# Patient Record
Sex: MALE | Race: WHITE | Employment: FULL TIME | URBAN - METROPOLITAN AREA
[De-identification: names, ages, dates, MRNs, and addresses within clinical notes are randomized per-mention and may not be internally consistent; named-entity substitution may affect disease eponyms.]

---

## 2022-06-09 ENCOUNTER — OFFICE VISIT (OUTPATIENT)
Dept: ORTHOPEDIC SURGERY | Age: 32
End: 2022-06-09
Payer: COMMERCIAL

## 2022-06-09 VITALS — HEIGHT: 69 IN | WEIGHT: 190 LBS | BODY MASS INDEX: 28.14 KG/M2

## 2022-06-09 DIAGNOSIS — M77.8 TRICEPS TENDONITIS: ICD-10-CM

## 2022-06-09 DIAGNOSIS — M54.12 CERVICAL RADICULOPATHY: Primary | ICD-10-CM

## 2022-06-09 PROCEDURE — 99204 OFFICE O/P NEW MOD 45 MIN: CPT | Performed by: ORTHOPAEDIC SURGERY

## 2022-06-09 RX ORDER — PREDNISONE 10 MG/1
TABLET ORAL
COMMUNITY
Start: 2022-06-05

## 2022-06-09 RX ORDER — NAPROXEN 500 MG/1
TABLET ORAL
COMMUNITY
Start: 2022-06-05

## 2022-06-09 RX ORDER — MELOXICAM 7.5 MG/1
7.5 TABLET ORAL 2 TIMES DAILY
Qty: 42 TABLET | Refills: 0 | Status: SHIPPED | OUTPATIENT
Start: 2022-06-09 | End: 2022-06-30

## 2022-06-09 NOTE — PROGRESS NOTES
Orthopaedic Hand Clinic Note    Name: Malvin Eaton  YOB: 1990  Gender: male  MRN: [id number]      CC: Patient referred for evaluation of upper extremity pain    HPI: Malvin Eaton is a 29 y.o. male with a chief complaint of pain in his right elbow. He is pointing to the distal triceps tendon. He said this started as pain in his neck, and this eventually radiated down to his shoulder, and now it feels like it is in his elbow. Says it feels better if he elevates his arm above his head. He also has had numbness and tingling in his index finger since this started. He has gotten a prescription for naproxen and steroids but does not think they have helped much. He has no history of injury      ROS/Meds/PSH/PMH/FH/SH: I personally reviewed the patients standard intake form. Pertinents are discussed in the HPI    Physical Examination:    Musculoskeletal Exam:  Examination on the right upper extremity demonstrates cap refill < 5 seconds in all fingers, skin is intact, there is mild tenderness to palpation over the distal triceps insertion. He has mild pain at the extreme of elbow flexion, mild pain with resisted elbow extension. Light touch sensation is diminished in the index finger only, is intact in the remainder of the median, ulnar and radial distributions. He has a negative Tinel over the carpal tunnel, negative carpal tunnel compression and Phalen's test.  Cervical spine range of motion is normal, but he does have a positive Spurling's to the right. Imaging / Electrodiagnostic Tests:     Cervical Spine XR: AP, Lateral views     Clinical Indication  1. Cervical radiculopathy    2. Triceps tendonitis           Report: AP, lateral x-ray of the cervical spine demonstrates loss of cervical lordosis. Well preserved disc spaces    Impression: as above     Jodie Figueroa MD         Assessment:     ICD-10-CM    1.  Cervical radiculopathy  M54.12 XR CERVICAL SPINE (2-3 VIEWS)     meloxicam (MOBIC) 7.5 MG tablet     MRI CERVICAL SPINE WO CONTRAST   2. Triceps tendonitis  M77.8        Plan:   We discussed the diagnosis and different treatment options. Symptoms today are more concerning for cervical radiculopathy rather than triceps tendonitis. We discussed observation, therapy, antiinflammatory medications and other pertinent treatment modalities. After discussing in detail the patient elects to proceed with prescription for mobic, and MRI of the cervical spine. Patient voiced accordance and understanding of the information provided and the formulated plan. All questions were answered to the patient's satisfaction during the encounter.       Nik Valdez MD  Orthopaedic Surgery  06/09/22  3:48 PM

## 2022-06-23 ENCOUNTER — OFFICE VISIT (OUTPATIENT)
Dept: ORTHOPEDIC SURGERY | Age: 32
End: 2022-06-23
Payer: COMMERCIAL

## 2022-06-23 VITALS — HEIGHT: 69 IN | BODY MASS INDEX: 28.14 KG/M2 | WEIGHT: 190 LBS

## 2022-06-23 DIAGNOSIS — M54.12 CERVICAL RADICULOPATHY: Primary | ICD-10-CM

## 2022-06-23 PROCEDURE — 99214 OFFICE O/P EST MOD 30 MIN: CPT | Performed by: ORTHOPAEDIC SURGERY

## 2022-06-27 NOTE — PROGRESS NOTES
Orthopaedic Hand Clinic Note    Name: Malvin Eaton  YOB: 1990  Gender: male  MRN: [id number]      Follow up visit:   1. Cervical radiculopathy        HPI: Malvin Eaton is a 29 y.o. male who is following up for right arm pain and index finger numbness. He is here to review his MRI. He continues to have some pain in the elbow. Finger numbness has not improved. ROS/Meds/PSH/PMH/FH/SH: I personally reviewed the patients standard intake form. Pertinents are discussed in the HPI    Physical Examination:    Musculoskeletal Examination:  Examination on the right upper extremity demonstrates cap refill < 5 seconds in all fingers, skin is intact, there is mild tenderness to palpation over the distal triceps insertion. He has mild pain at the extreme of elbow flexion, mild pain with resisted elbow extension. Light touch sensation is diminished in the index finger only, is intact in the remainder of the median, ulnar and radial distributions. He has a negative Tinel over the carpal tunnel, negative carpal tunnel compression and Phalen's test.  Cervical spine range of motion is normal, but he does have a positive Spurling's to the right. Imaging / Electrodiagnostic Tests:     I independently reviewed and interpreted MRI of the cervical spine. At C6-7 there is a disc osteophyte complex which results in mild right neural foraminal narrowing    Assessment:     ICD-10-CM    1. Cervical radiculopathy  M54.12        Plan:   We discussed the diagnosis and different treatment options. We discussed observation, therapy, antiinflammatory medications and other pertinent treatment modalities. After discussing in detail the patient elects to proceed with continued NSAIDs for pain, and I have recommended follow up with our spine specialists. He may have some mild triceps tendonitis, but his symptoms are more consistent with cervical radiculopathy. He will follow up with me as needed. .     Patient voiced accordance and understanding of the information provided and the formulated plan. All questions were answered to the patient's satisfaction during the encounter.       Harry Dove MD  Orthopaedic Surgery  06/27/22  12:12 PM

## 2022-07-19 ENCOUNTER — OFFICE VISIT (OUTPATIENT)
Dept: ORTHOPEDIC SURGERY | Age: 32
End: 2022-07-19
Payer: COMMERCIAL

## 2022-07-19 DIAGNOSIS — M50.10 HERNIATION OF CERVICAL INTERVERTEBRAL DISC WITH RADICULOPATHY: ICD-10-CM

## 2022-07-19 DIAGNOSIS — M50.30 DDD (DEGENERATIVE DISC DISEASE), CERVICAL: Primary | ICD-10-CM

## 2022-07-19 DIAGNOSIS — M54.12 CERVICAL RADICULOPATHY: ICD-10-CM

## 2022-07-19 PROCEDURE — 99203 OFFICE O/P NEW LOW 30 MIN: CPT | Performed by: PHYSICIAN ASSISTANT

## 2022-07-19 NOTE — PROGRESS NOTES
Name: Saul Lugo  YOB: 1990  Gender: male  MRN: [id number]    CC: Arm Pain (Right elbow pain down to hand and fingers with numbness in the index finger)       History of present illness: This is a very pleasant 29 y.o. old male who presents with 2-month history of pain that radiated the right posterior arm into the right second finger. He does have some discomfort in the right lateral aspect of the neck at times feels more in the arm. Symptoms initially were quite severe with some numbness into his second finger. He went to urgent care he had oral steroids, anti-inflammatories and then was referred to Dr. Marlene Weaver who also prescribed anti-inflammatories and muscle relaxer. She felt it was more of a radiculopathy. Referred him to us. Today reports symptoms have improved. He has 0 pain today he has a little bit of numbness in the right second finger but overall much better. No particular medicine improve the symptoms he feels like it was just time. There have not been changes in fine motor skills such as handwriting and buttoning buttons. There have not been changes in gait since the onset of the symptoms. The patient has not had cervical surgery in the past.    .       AMB PAIN ASSESSMENT 7/19/2022   Location of Pain Elbow   Severity of Pain 7   Quality of Pain Dull   Duration of Pain Persistent   Frequency of Pain Intermittent   Date Pain First Started 5/3/2022   Aggravating Factors Other (Comment)   Limiting Behavior No   Relieving Factors Other (Comment)   Result of Injury No   Work-Related Injury No   Are there other pain locations you wish to document? No          ROS/Meds/PSH/PMH/FH/SH: I personally reviewed the patient's collected intake data.   Below are the pertinents:    No Known Allergies      Current Outpatient Medications:     naproxen (NAPROSYN) 500 MG tablet, TAKE 1 TABLET (500 MG) BY MOUTH EVERY 12 (TWELVE) HOURS AS NEEDED FOR MODERATE PAIN (Patient not taking: arteries. Radiographic Studies:     AP and Lateral views of the Cervical Spine:   MRI scan of the cervical spine from [facility], 2022 images and report are independently reviewed. C5-6 there is broad-based disc osteophyte complex no spinal stenosis I would say there is right foraminal stenosis and lateral recess stenosis. C6-7 shallow broad-based disc osteophyte complex without spinal stenosis there is mild right foraminal narrowing. There is no cord signal change. Assessment/Plan:         Diagnosis Orders   1. DDD (degenerative disc disease), cervical        2. Cervical radiculopathy        3. Herniation of cervical intervertebral disc with radiculopathy            This patient's clinical history and physical exam is consistent with cervical radiculopathy involving primarily the right C6 and C7 nerve root(s). I discussed the natural history of this condition with the patient in that often these patients will experience diminishing of their symptoms within several weeks of conservative care. We also discussed that the typical conservative treatments available include rest, NSAIDs, pain medication, and physical therapy as symptoms allow. Oral and/or epidural steroids are other options to consider. I discussed potential surgical options including a discectomy and fusion if the symptoms fail to improve or there is a progressive neurologic deficit and conservative management has been exhausted. Symptoms have significantly improved. We did discuss that if they return, I would refer him for cervical epidural steroid injection. If all conservative measures fail, he would likely be a candidate for C5-6 possible 6 7 ACDF.      -Observation only:  The patient will be treated observantly with self directed symptomatic care. Instructions were given to follow up if there is any neurologic or functional decline. No orders of the defined types were placed in this encounter.        No orders of the defined types were placed in this encounter. 3 This is an acute, uncomplicated illness/injury      No follow-ups on file. Mary Jo Fox PA-C  07/19/22      Elements of this note were created using speech recognition software. As such, errors of speech recognition may be present.

## 2022-07-19 NOTE — LETTER
Darrionmichellesaundra King                                                     [facility]  1990  MRN [id number]                                              ROOM NUMBER______      Radiographic Studies:    Cervical MRI      Thoracic MRI         Lumbar MRI          Pelvis MRI        CONTRAST    CT Myelogram: _______________   NCS/EMG ________________ ( UE  /  LE )     MRI of ___________________          Other: ____________________      Injections:    KNEE    HIP  Depomedrol _____ mg Euflexxa _____    _______________ TFESI/SNRB  _______________ SI Joint  _______________ GOSIA    _______________ Facet  _______________Piriformis/ Sciatica      Medications:    Oral Steroids _______________  NSAIDS _______________    Muscle Relaxers _______________  Neurontin/Lyrica _______________    Pain Medicine _______________  Other _______________                       Physical Therapy:    Lumbar     Thoracic      Cervical     Hip       Knee       Shoulder               Traction          Ultrasound          Dry Needling      Referral:    Pain referral:  [facility]   [facility]   Other: ______________________________    Follow-up/ Refer__________________________________________________    Authorization to hold blood thinners:___________________________________